# Patient Record
Sex: FEMALE | Race: WHITE | Employment: UNEMPLOYED | URBAN - METROPOLITAN AREA
[De-identification: names, ages, dates, MRNs, and addresses within clinical notes are randomized per-mention and may not be internally consistent; named-entity substitution may affect disease eponyms.]

---

## 2017-03-09 ENCOUNTER — OFFICE VISIT (OUTPATIENT)
Dept: ENDOCRINOLOGY | Age: 47
End: 2017-03-09

## 2017-03-09 VITALS
HEIGHT: 60 IN | HEART RATE: 84 BPM | RESPIRATION RATE: 16 BRPM | BODY MASS INDEX: 25.17 KG/M2 | WEIGHT: 128.2 LBS | SYSTOLIC BLOOD PRESSURE: 111 MMHG | OXYGEN SATURATION: 97 % | DIASTOLIC BLOOD PRESSURE: 70 MMHG

## 2017-03-09 DIAGNOSIS — E13.40 DIABETIC NEUROPATHY ASSOCIATED WITH OTHER SPECIFIED DIABETES MELLITUS: Primary | ICD-10-CM

## 2017-03-09 LAB — HBA1C MFR BLD: 8.1 %

## 2017-03-09 PROCEDURE — 83036 HEMOGLOBIN GLYCOSYLATED A1C: CPT | Performed by: INTERNAL MEDICINE

## 2017-03-09 PROCEDURE — 99214 OFFICE O/P EST MOD 30 MIN: CPT | Performed by: INTERNAL MEDICINE

## 2017-03-09 RX ORDER — LAMOTRIGINE 200 MG/1
200 TABLET ORAL DAILY
COMMUNITY

## 2017-03-09 RX ORDER — PROPRANOLOL HYDROCHLORIDE 20 MG/1
20 TABLET ORAL 3 TIMES DAILY
COMMUNITY

## 2017-03-09 RX ORDER — BUTALBITAL, ASPIRIN, AND CAFFEINE 325; 50; 40 MG/1; MG/1; MG/1
1 CAPSULE ORAL EVERY 4 HOURS PRN
COMMUNITY

## 2017-03-09 RX ORDER — ALBUTEROL SULFATE 90 UG/1
2 AEROSOL, METERED RESPIRATORY (INHALATION) EVERY 6 HOURS PRN
COMMUNITY
End: 2019-06-06 | Stop reason: ALTCHOICE

## 2017-03-15 DIAGNOSIS — E10.42 DM TYPE 1 WITH DIABETIC PERIPHERAL NEUROPATHY (HCC): ICD-10-CM

## 2017-05-31 ENCOUNTER — TELEPHONE (OUTPATIENT)
Dept: ENDOCRINOLOGY | Age: 47
End: 2017-05-31

## 2017-06-15 ENCOUNTER — OFFICE VISIT (OUTPATIENT)
Dept: ENDOCRINOLOGY | Age: 47
End: 2017-06-15

## 2017-06-15 VITALS
WEIGHT: 126.2 LBS | RESPIRATION RATE: 16 BRPM | HEART RATE: 82 BPM | OXYGEN SATURATION: 95 % | HEIGHT: 60 IN | SYSTOLIC BLOOD PRESSURE: 115 MMHG | DIASTOLIC BLOOD PRESSURE: 72 MMHG | BODY MASS INDEX: 24.77 KG/M2

## 2017-06-15 DIAGNOSIS — E10.8 TYPE 1 DIABETES MELLITUS WITH COMPLICATION (HCC): Primary | ICD-10-CM

## 2017-06-15 DIAGNOSIS — R79.89 HIGH THYROID STIMULATING HORMONE (TSH) LEVEL: ICD-10-CM

## 2017-06-15 PROCEDURE — 99214 OFFICE O/P EST MOD 30 MIN: CPT | Performed by: INTERNAL MEDICINE

## 2017-06-15 RX ORDER — HYDROXYZINE PAMOATE 50 MG/1
50 CAPSULE ORAL 3 TIMES DAILY PRN
COMMUNITY
End: 2018-11-15 | Stop reason: ALTCHOICE

## 2017-07-26 ENCOUNTER — TELEPHONE (OUTPATIENT)
Dept: ENDOCRINOLOGY | Age: 47
End: 2017-07-26

## 2017-07-26 DIAGNOSIS — E10.42 DM TYPE 1 WITH DIABETIC PERIPHERAL NEUROPATHY (HCC): ICD-10-CM

## 2017-07-26 RX ORDER — LANCETS
EACH MISCELLANEOUS
Qty: 300 EACH | Refills: 11 | Status: SHIPPED | OUTPATIENT
Start: 2017-07-26

## 2017-08-02 ENCOUNTER — TELEPHONE (OUTPATIENT)
Dept: ENDOCRINOLOGY | Age: 47
End: 2017-08-02

## 2017-08-31 ENCOUNTER — TELEPHONE (OUTPATIENT)
Dept: ENDOCRINOLOGY | Age: 47
End: 2017-08-31

## 2017-08-31 DIAGNOSIS — E10.42 DM TYPE 1 WITH DIABETIC PERIPHERAL NEUROPATHY (HCC): ICD-10-CM

## 2017-10-13 RX ORDER — BLOOD-GLUCOSE METER
1 EACH MISCELLANEOUS ONCE
Qty: 1 DEVICE | Refills: 0 | OUTPATIENT
Start: 2017-10-13 | End: 2017-10-13

## 2018-01-28 ENCOUNTER — TELEPHONE (OUTPATIENT)
Dept: ENDOCRINOLOGY | Age: 48
End: 2018-01-28

## 2018-11-15 ENCOUNTER — OFFICE VISIT (OUTPATIENT)
Dept: ENDOCRINOLOGY | Age: 48
End: 2018-11-15
Payer: MEDICARE

## 2018-11-15 VITALS
WEIGHT: 126 LBS | SYSTOLIC BLOOD PRESSURE: 112 MMHG | RESPIRATION RATE: 16 BRPM | HEART RATE: 87 BPM | HEIGHT: 60 IN | DIASTOLIC BLOOD PRESSURE: 71 MMHG | OXYGEN SATURATION: 95 % | BODY MASS INDEX: 24.74 KG/M2

## 2018-11-15 DIAGNOSIS — E10.8 TYPE 1 DIABETES MELLITUS WITH COMPLICATION (HCC): Primary | ICD-10-CM

## 2018-11-15 DIAGNOSIS — E10.42 DIABETIC POLYNEUROPATHY ASSOCIATED WITH TYPE 1 DIABETES MELLITUS (HCC): ICD-10-CM

## 2018-11-15 PROCEDURE — 99214 OFFICE O/P EST MOD 30 MIN: CPT | Performed by: INTERNAL MEDICINE

## 2018-11-15 RX ORDER — ATORVASTATIN CALCIUM 20 MG/1
20 TABLET, FILM COATED ORAL DAILY
COMMUNITY
End: 2019-06-07

## 2018-11-15 RX ORDER — QUETIAPINE FUMARATE 50 MG/1
50 TABLET, EXTENDED RELEASE ORAL NIGHTLY
COMMUNITY

## 2018-11-15 RX ORDER — MELOXICAM 15 MG/1
15 TABLET ORAL DAILY
COMMUNITY

## 2018-11-15 RX ORDER — FLUOXETINE 10 MG/1
10 CAPSULE ORAL DAILY
COMMUNITY

## 2018-11-15 RX ORDER — KETOCONAZOLE 20 MG/ML
SHAMPOO TOPICAL DAILY PRN
COMMUNITY

## 2018-11-15 RX ORDER — THIAMINE MONONITRATE (VIT B1) 100 MG
100 TABLET ORAL DAILY
COMMUNITY

## 2018-11-15 RX ORDER — ACETAMINOPHEN 500 MG
500 TABLET ORAL EVERY 6 HOURS PRN
COMMUNITY

## 2018-11-15 RX ORDER — GABAPENTIN 100 MG/1
100 CAPSULE ORAL 3 TIMES DAILY
COMMUNITY

## 2018-11-15 RX ORDER — DIVALPROEX SODIUM 125 MG/1
125 CAPSULE, COATED PELLETS ORAL 3 TIMES DAILY
COMMUNITY

## 2018-11-15 RX ORDER — LOPERAMIDE HYDROCHLORIDE 2 MG/1
2 CAPSULE ORAL 4 TIMES DAILY PRN
COMMUNITY

## 2018-11-15 RX ORDER — UREA 10 %
800 LOTION (ML) TOPICAL DAILY
COMMUNITY

## 2018-11-15 NOTE — PROGRESS NOTES
of Onset    Cancer Mother         breast     Heart Disease Father     Arthritis Maternal Grandmother     Osteoporosis Maternal Grandmother     Heart Disease Maternal Grandmother     Arthritis Paternal Grandmother     Osteoporosis Paternal Grandmother     Stroke Paternal Grandfather      History   Smoking Status    Former Smoker    Packs/day: 0.50    Years: 8.00   Smokeless Tobacco    Former User     Comment: still not ready to quit 2/18/14      History   Alcohol Use No       HPI    Nidhi Acuna is a 48 yrs old white female who is here for a follow-up for Type 1 DM    PCP Dr. Brigitte Duarte MD     Last seen in 06/17. Interim history : had episodes of severe DKA, hypoglycemia, neurological complications,Had trach and PEG  She is doing much better. No recent hospitalizations. Now living at [facility] since 12/17    She has a PMH of type 1 DM, depression, anxiety, fibromyalgia. Multiple hospitalizations for hypoglycemia, DKA    She was in alf in 11/14 for 4-6 weeks and then again in 2015 and 2016. 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      Had multiple episodes of DKA in [id number] when she was on insulin pump. She was diagnosed with type 1 DM at age of 13 yrs. Microvascular complications :No Retinopathy ( last eye exam 1/14 as per patient), no known nephropathy ( last urine microalbumin was normal in 01/13 and 01/15). She has diabetic neuropathy in both feet. She has numbness and tingling. No ulcer. She was on insulin pump since 2008. Her insurance is not covering her insulin pump supplies. Currently on   Levemir 7 units QHS  Novolog 5 units TID   Novolog SSI  -180 4 units  181-240 6 units  241-300 8 units  301-350 10  351-400 12 units    Checks BS 4-5 times a day    FBS   Lunch 100-250  Dinner 100-250  Bedtime 100-200      Hypoglycemia: no significant episodes recently. Diet pattern: Eats 2-3 meals /day. No Snacks.        Review of Systems

## 2019-02-19 ENCOUNTER — OFFICE VISIT (OUTPATIENT)
Dept: ENDOCRINOLOGY | Age: 49
End: 2019-02-19
Payer: MEDICARE

## 2019-02-19 VITALS
HEART RATE: 92 BPM | BODY MASS INDEX: 31.8 KG/M2 | DIASTOLIC BLOOD PRESSURE: 70 MMHG | HEIGHT: 60 IN | WEIGHT: 162 LBS | OXYGEN SATURATION: 95 % | SYSTOLIC BLOOD PRESSURE: 124 MMHG

## 2019-02-19 DIAGNOSIS — E78.2 MIXED HYPERLIPIDEMIA: ICD-10-CM

## 2019-02-19 DIAGNOSIS — E10.42 DIABETIC POLYNEUROPATHY ASSOCIATED WITH TYPE 1 DIABETES MELLITUS (HCC): ICD-10-CM

## 2019-02-19 DIAGNOSIS — E10.8 TYPE 1 DIABETES MELLITUS WITH COMPLICATION (HCC): Primary | ICD-10-CM

## 2019-02-19 LAB — HBA1C MFR BLD: 8.6 %

## 2019-02-19 PROCEDURE — 99214 OFFICE O/P EST MOD 30 MIN: CPT | Performed by: INTERNAL MEDICINE

## 2019-02-19 PROCEDURE — 3017F COLORECTAL CA SCREEN DOC REV: CPT | Performed by: INTERNAL MEDICINE

## 2019-02-19 PROCEDURE — G8427 DOCREV CUR MEDS BY ELIG CLIN: HCPCS | Performed by: INTERNAL MEDICINE

## 2019-02-19 PROCEDURE — G8417 CALC BMI ABV UP PARAM F/U: HCPCS | Performed by: INTERNAL MEDICINE

## 2019-02-19 PROCEDURE — 2022F DILAT RTA XM EVC RTNOPTHY: CPT | Performed by: INTERNAL MEDICINE

## 2019-02-19 PROCEDURE — 1036F TOBACCO NON-USER: CPT | Performed by: INTERNAL MEDICINE

## 2019-02-19 PROCEDURE — G8484 FLU IMMUNIZE NO ADMIN: HCPCS | Performed by: INTERNAL MEDICINE

## 2019-02-19 PROCEDURE — 83036 HEMOGLOBIN GLYCOSYLATED A1C: CPT | Performed by: INTERNAL MEDICINE

## 2019-02-19 PROCEDURE — 3045F PR MOST RECENT HEMOGLOBIN A1C LEVEL 7.0-9.0%: CPT | Performed by: INTERNAL MEDICINE

## 2019-03-22 ENCOUNTER — TELEPHONE (OUTPATIENT)
Dept: ENDOCRINOLOGY | Age: 49
End: 2019-03-22

## 2019-04-09 ENCOUNTER — TELEPHONE (OUTPATIENT)
Dept: ENDOCRINOLOGY | Age: 49
End: 2019-04-09

## 2019-04-09 NOTE — TELEPHONE ENCOUNTER
recived lab results, [facility] Aggarwal called and asked for blood sugar logs to be sent over per the doctor they will send them

## 2019-06-06 ENCOUNTER — OFFICE VISIT (OUTPATIENT)
Dept: ENDOCRINOLOGY | Age: 49
End: 2019-06-06
Payer: MEDICARE

## 2019-06-06 VITALS
HEART RATE: 89 BPM | OXYGEN SATURATION: 96 % | WEIGHT: 167.6 LBS | BODY MASS INDEX: 32.9 KG/M2 | DIASTOLIC BLOOD PRESSURE: 79 MMHG | HEIGHT: 60 IN | SYSTOLIC BLOOD PRESSURE: 131 MMHG

## 2019-06-06 DIAGNOSIS — E10.8 TYPE 1 DIABETES MELLITUS WITH COMPLICATION (HCC): ICD-10-CM

## 2019-06-06 DIAGNOSIS — E78.2 MIXED HYPERLIPIDEMIA: ICD-10-CM

## 2019-06-06 DIAGNOSIS — E10.42 DIABETIC POLYNEUROPATHY ASSOCIATED WITH TYPE 1 DIABETES MELLITUS (HCC): Primary | ICD-10-CM

## 2019-06-06 LAB — HBA1C MFR BLD: 8.8 %

## 2019-06-06 PROCEDURE — 83036 HEMOGLOBIN GLYCOSYLATED A1C: CPT | Performed by: INTERNAL MEDICINE

## 2019-06-06 PROCEDURE — 99214 OFFICE O/P EST MOD 30 MIN: CPT | Performed by: INTERNAL MEDICINE

## 2019-06-06 PROCEDURE — 3045F PR MOST RECENT HEMOGLOBIN A1C LEVEL 7.0-9.0%: CPT | Performed by: INTERNAL MEDICINE

## 2019-06-06 PROCEDURE — 1036F TOBACCO NON-USER: CPT | Performed by: INTERNAL MEDICINE

## 2019-06-06 PROCEDURE — G8417 CALC BMI ABV UP PARAM F/U: HCPCS | Performed by: INTERNAL MEDICINE

## 2019-06-06 PROCEDURE — 2022F DILAT RTA XM EVC RTNOPTHY: CPT | Performed by: INTERNAL MEDICINE

## 2019-06-06 PROCEDURE — 3017F COLORECTAL CA SCREEN DOC REV: CPT | Performed by: INTERNAL MEDICINE

## 2019-06-06 PROCEDURE — G8427 DOCREV CUR MEDS BY ELIG CLIN: HCPCS | Performed by: INTERNAL MEDICINE

## 2019-06-06 NOTE — PROGRESS NOTES
[facility]  Shanta Shields M.D. Phone: [phone]   FAX: [phone]       Jad Kilgore   YOB: 1970    Date of Visit:  6/6/2019    Allergies   Allergen Reactions    Amoxicillin     Ketorolac Tromethamine     Tramadol     Sumatriptan Nausea And Vomiting     Outpatient Medications Marked as Taking for the 6/6/19 encounter (Office Visit) with Mario Muniz MD   Medication Sig Dispense Refill    insulin aspart (NOVOLOG FLEXPEN) 100 UNIT/ML injection pen Inject 6 units before breakfast, 5 units before lunch and supper + sliding scale. 5 pen 2    gabapentin (NEURONTIN) 100 MG capsule Take 100 mg by mouth 3 times daily. Douglas Demark divalproex (DEPAKOTE SPRINKLES) 125 MG capsule Take 125 mg by mouth 3 times daily      potassium bicarb-citric acid (EFFER-K) 20 MEQ TBEF effervescent tablet Take 20 mEq by mouth daily      folic acid (FOLVITE) 900 MCG tablet Take 800 mcg by mouth daily      Dextrose, Diabetic Use, (GLUTOSE 15 PO) Take by mouth      ketoconazole (NIZORAL) 2 % shampoo Apply topically daily as needed for Itching Apply topically daily as needed.  loperamide (IMODIUM) 2 MG capsule Take 2 mg by mouth 4 times daily as needed for Diarrhea      atorvastatin (LIPITOR) 20 MG tablet Take 20 mg by mouth daily      acetaminophen (TYLENOL) 500 MG tablet Take 500 mg by mouth every 6 hours as needed for Pain      vitamin B-1 (THIAMINE) 100 MG tablet Take 100 mg by mouth daily      FLUoxetine (PROZAC) 10 MG capsule Take 10 mg by mouth daily      QUEtiapine (SEROQUEL XR) 50 MG extended release tablet Take 50 mg by mouth nightly      meloxicam (MOBIC) 15 MG tablet Take 15 mg by mouth daily      LEVEMIR FLEXPEN 100 UNIT/ML injection pen Inject 7 Units into the skin nightly      glucagon 1 MG injection Inject for low blood sugar as directed. 4 kit 5    ONE TOUCH ULTRASOFT LANCETS MISC Test 8-10 times daily. Brittle type 1 DM. H/o significant hypoglycemia and DKA.  300 each 11  glucose blood VI test strips (ONETOUCH VERIO) strip Test 5-6 times daily. Brittle type 1 DM. Significant hypoglycemia, hyperglycemia. 200 each 11    Insulin Pen Needle 32G X 4 MM MISC Inject 1 each into the skin 4 times daily 300 each 2    lamoTRIgine (LAMICTAL) 200 MG tablet Take 200 mg by mouth daily      butalbital-aspirin-caffeine (FIORINAL) -40 MG capsule Take 1 capsule by mouth every 4 hours as needed for Headaches      propranolol (INDERAL) 20 MG tablet Take 20 mg by mouth 3 times daily      traZODone (DESYREL) 150 MG tablet Take 150 mg by mouth nightly      Blood Glucose Monitoring Suppl (ONETOUCH VERIO) W/DEVICE KIT Use as directed to monitor sugars. 1 kit 0    ibuprofen (ADVIL;MOTRIN) 800 MG tablet Take 1 tablet by mouth every 8 hours as needed for Pain. 90 tablet 4         Vitals:    19 1054   BP: 131/79   Site: Right Upper Arm   Position: Sitting   Cuff Size: Medium Adult   Pulse: 89   SpO2: 96%   Weight: 167 lb 9.6 oz (76 kg)   Height: 5' (1.524 m)     Body mass index is 32.73 kg/m².      Wt Readings from Last 3 Encounters:   19 167 lb 9.6 oz (76 kg)   19 162 lb (73.5 kg)   11/15/18 126 lb (57.2 kg)     BP Readings from Last 3 Encounters:   19 131/79   19 124/70   11/15/18 112/71        Past Medical History:   Diagnosis Date    Anxiety attack     Arthritis     Back pain     Depression     Diabetes mellitus type 1 ([address] dx 10 yoa    endo: Dr. Miguel Staples, A1c 7.6 2013, using pump since     Diabetic neuropathy ([address]     Fibromyalgia     Migraine     Numbness of toes      Past Surgical History:   Procedure Laterality Date    ANKLE SURGERY  left ankle     SECTION      3     CHOLECYSTECTOMY      DILATION AND CURETTAGE OF UTERUS      HYSTERECTOMY      LAPAROSCOPY      SHOULDER SURGERY  left    TOE SURGERY  x2 left foot 5th digit    TUMOR REMOVAL  bone     Family History   Problem Relation Age of Onset    Cancer Mother         breast diabetic neuropathy in both feet. She has numbness and tingling. No ulcer. On neurontin 100 mg at bedtime. Review of Systems   Constitutional: Positive for malaise/fatigue. Negative for weight loss. HENT: Negative for sore throat. Eyes: Negative for blurred vision and double vision. Respiratory: Negative for cough and shortness of breath. Cardiovascular: Negative for chest pain and palpitations. Gastrointestinal: Negative for heartburn, nausea, vomiting and abdominal pain. + Diarrhea. Genitourinary: Negative for urgency and frequency. Musculoskeletal: Positive for myalgias and joint pain. Negative for back pain. Skin: Negative for rash. Neurological: Positive for tingling and sensory change. Negative for headaches. Endo/Heme/Allergies: Negative for polydipsia. Psychiatric/Behavioral:  Negative for depression. The patient is not nervous/anxious. Physical Exam   Constitutional: She is oriented to person, place, and time. She appears well-developed and well-nourished. HENT:   Head: Normocephalic. Mouth/Throat: Oropharynx is clear and moist.   Eyes: EOM are normal. Right eye exhibits no discharge. Neck: No thyromegaly present. Cardiovascular: Normal rate and normal heart sounds. Pulmonary/Chest: Effort normal and breath sounds normal.   Abdominal: Soft. She exhibits no distension. There is no tenderness. Musculoskeletal: She exhibits edema (Left More than Right). She exhibits no tenderness. No ulcer on foot exam  No calus on foot exam   Neurological: She is alert and oriented to person, place, and time. Impaired sensation to 10 grams monofilament testing. Normal pulses in both feet. Skin: Skin is warm. No rash noted. She is not diaphoretic. Psychiatric: Her behavior is normal.          Assessment/Plan    1. Type 1 DM    This 46 yrs old female has Type 1 DM which is very  brittle.   Had episodes of severe hypoglycemia and DKA in the past.  No recent hospitalizations. A1c 11.1 %---> 7 %----> 7.5 %--->6.6 %-->6.9 %---> 7.4 %---> 6.6 % ---> 7.2 %  ---> 7.9 % --> 6.7 % ---> 6.5 %---> 8.1 % ---> 8.4 % ---> 8.6 % ---> 8.8 %     Complicated by neuropathy. BS variable. BS high after breakfast     - Coninue levemir 11 units QHS   Increase novolog to 7 units with breakfast   -Continue novolog 5 units with lunch and dinner  Continue same sliding scale. Called nurse to update the insulin dose. Recommended eye exam once a year. No nephropathy ( last urine microalbumin normal in 01/13 and 01/15, 04/17). Discussed foot care. Foot exam done 11/18  Smoker. Counseled on  smoking cessation. BP at goal .      2. Diabetic neuropathy. On Neurontin    3. Hyperlipidemia    HDl 47    . On  fish oil and lipitor      [facility] to fax me her sugars every 2 weeks and fax recent lab results. [address] to give orders on change of insulin to her Nurse Brock Oliva.

## 2019-06-06 NOTE — PATIENT INSTRUCTIONS
Lab Results   Component Value Date    LABA1C 8.8 06/06/2019     Lab Results   Component Value Date     01/11/2013

## 2019-06-07 RX ORDER — ATORVASTATIN CALCIUM 20 MG/1
20 TABLET, FILM COATED ORAL DAILY
Qty: 30 TABLET | Refills: 5
Start: 2019-06-07

## 2019-09-12 ENCOUNTER — OFFICE VISIT (OUTPATIENT)
Dept: ENDOCRINOLOGY | Age: 49
End: 2019-09-12
Payer: MEDICARE

## 2019-09-12 VITALS
SYSTOLIC BLOOD PRESSURE: 124 MMHG | HEART RATE: 107 BPM | HEIGHT: 60 IN | DIASTOLIC BLOOD PRESSURE: 75 MMHG | OXYGEN SATURATION: 95 % | BODY MASS INDEX: 34.59 KG/M2 | WEIGHT: 176.2 LBS

## 2019-09-12 DIAGNOSIS — E78.2 MIXED HYPERLIPIDEMIA: ICD-10-CM

## 2019-09-12 DIAGNOSIS — J06.9 UPPER RESPIRATORY TRACT INFECTION, UNSPECIFIED TYPE: ICD-10-CM

## 2019-09-12 DIAGNOSIS — E10.42 DIABETIC POLYNEUROPATHY ASSOCIATED WITH TYPE 1 DIABETES MELLITUS (HCC): ICD-10-CM

## 2019-09-12 DIAGNOSIS — E10.8 TYPE 1 DIABETES MELLITUS WITH COMPLICATION (HCC): Primary | ICD-10-CM

## 2019-09-12 LAB — HBA1C MFR BLD: 8.3 %

## 2019-09-12 PROCEDURE — G8427 DOCREV CUR MEDS BY ELIG CLIN: HCPCS | Performed by: INTERNAL MEDICINE

## 2019-09-12 PROCEDURE — G8417 CALC BMI ABV UP PARAM F/U: HCPCS | Performed by: INTERNAL MEDICINE

## 2019-09-12 PROCEDURE — 83036 HEMOGLOBIN GLYCOSYLATED A1C: CPT | Performed by: INTERNAL MEDICINE

## 2019-09-12 PROCEDURE — 3017F COLORECTAL CA SCREEN DOC REV: CPT | Performed by: INTERNAL MEDICINE

## 2019-09-12 PROCEDURE — 2022F DILAT RTA XM EVC RTNOPTHY: CPT | Performed by: INTERNAL MEDICINE

## 2019-09-12 PROCEDURE — 1036F TOBACCO NON-USER: CPT | Performed by: INTERNAL MEDICINE

## 2019-09-12 PROCEDURE — 99214 OFFICE O/P EST MOD 30 MIN: CPT | Performed by: INTERNAL MEDICINE

## 2019-09-12 PROCEDURE — 3045F PR MOST RECENT HEMOGLOBIN A1C LEVEL 7.0-9.0%: CPT | Performed by: INTERNAL MEDICINE

## 2019-09-12 RX ORDER — OMEGA-3/DHA/EPA/FISH OIL 60 MG-90MG
500 CAPSULE ORAL 2 TIMES DAILY
COMMUNITY

## 2019-09-12 NOTE — PROGRESS NOTES
Snacks. Hyperlipidemia :   On fish oil 2 capsule daily and  lipitor 20 mg daily    Tolerating without side effects    She has diabetic neuropathy in both feet. She has numbness and tingling. No ulcer. On neurontin 100 mg at bedtime. Review of Systems   Constitutional: Positive for malaise/fatigue. Negative for weight loss. HENT: Negative for sore throat. Eyes: Negative for blurred vision and double vision. Respiratory: Negative for cough and shortness of breath. Cardiovascular: Negative for chest pain and palpitations. Gastrointestinal: Negative for heartburn, nausea, vomiting and abdominal pain. + Diarrhea. Genitourinary: Negative for urgency and frequency. Musculoskeletal: Positive for myalgias and joint pain. Negative for back pain. Skin: Negative for rash. Neurological: Positive for tingling and sensory change. Negative for headaches. Endo/Heme/Allergies: Negative for polydipsia. Psychiatric/Behavioral:  Negative for depression. The patient is not nervous/anxious. Physical Exam   Constitutional: She is oriented to person, place, and time. She appears well-developed and well-nourished. HENT:   Head: Normocephalic. Mouth/Throat: Oropharynx is clear and moist.   Eyes: EOM are normal. Right eye exhibits no discharge. Neck: No thyromegaly present. Cardiovascular: Normal rate and normal heart sounds. Pulmonary/Chest: Effort normal and breath sounds normal.   Abdominal: Soft. She exhibits no distension. There is no tenderness. Musculoskeletal: She exhibits edema (Left More than Right). She exhibits no tenderness. No ulcer on foot exam  No calus on foot exam   Neurological: She is alert and oriented to person, place, and time. Impaired sensation to 10 grams monofilament testing. Normal pulses in both feet. Skin: Skin is warm. No rash noted. She is not diaphoretic. Psychiatric: Her behavior is normal.          Assessment/Plan    1.  Type 1 DM    This 48

## 2019-09-18 ENCOUNTER — TELEPHONE (OUTPATIENT)
Dept: ENDOCRINOLOGY | Age: 49
End: 2019-09-18

## 2020-01-16 ENCOUNTER — OFFICE VISIT (OUTPATIENT)
Dept: ENDOCRINOLOGY | Age: 50
End: 2020-01-16
Payer: MEDICARE

## 2020-01-16 VITALS
SYSTOLIC BLOOD PRESSURE: 115 MMHG | HEIGHT: 60 IN | WEIGHT: 173.8 LBS | HEART RATE: 95 BPM | DIASTOLIC BLOOD PRESSURE: 74 MMHG | OXYGEN SATURATION: 98 % | BODY MASS INDEX: 34.12 KG/M2

## 2020-01-16 LAB — HBA1C MFR BLD: 10 %

## 2020-01-16 PROCEDURE — G8427 DOCREV CUR MEDS BY ELIG CLIN: HCPCS | Performed by: INTERNAL MEDICINE

## 2020-01-16 PROCEDURE — 1036F TOBACCO NON-USER: CPT | Performed by: INTERNAL MEDICINE

## 2020-01-16 PROCEDURE — 83036 HEMOGLOBIN GLYCOSYLATED A1C: CPT | Performed by: INTERNAL MEDICINE

## 2020-01-16 PROCEDURE — 3017F COLORECTAL CA SCREEN DOC REV: CPT | Performed by: INTERNAL MEDICINE

## 2020-01-16 PROCEDURE — 3046F HEMOGLOBIN A1C LEVEL >9.0%: CPT | Performed by: INTERNAL MEDICINE

## 2020-01-16 PROCEDURE — 99214 OFFICE O/P EST MOD 30 MIN: CPT | Performed by: INTERNAL MEDICINE

## 2020-01-16 PROCEDURE — G8417 CALC BMI ABV UP PARAM F/U: HCPCS | Performed by: INTERNAL MEDICINE

## 2020-01-16 PROCEDURE — G8484 FLU IMMUNIZE NO ADMIN: HCPCS | Performed by: INTERNAL MEDICINE

## 2020-01-16 PROCEDURE — 2022F DILAT RTA XM EVC RTNOPTHY: CPT | Performed by: INTERNAL MEDICINE

## 2020-01-16 NOTE — PROGRESS NOTES
Behavior: Behavior normal.            Assessment/Plan    1. Type 1 DM    This 44 yrs old female has Type 1 DM which is very  brittle. Had episodes of severe hypoglycemia and DKA in the past.  No recent hospitalizations. A1c 11.1 %---> 7 %----> 7.5 %--->6.6 %-->6.9 %---> 7.4 %---> 6.6 % ---> 7.2 %  ---> 7.9 % --> 6.7 % ---> 6.5 %---> 8.1 % ---> 8.4 % ---> 8.6 % ---> 8.8 % ---> 8.3 % ---> 10 %     Complicated by neuropathy. BS variable. fasting blood sugars high.       - Increase levemir to 12 units QHS   Continue novolog  7 units with breakfast, 5 units with lunch and dinner  Continue same sliding scale. Called nurse to update the insulin dose. Recommended eye exam once a year. No nephropathy ( last urine microalbumin normal in 01/13 and 01/15, 04/17). Discussed foot care. Foot exam done 11/18  Smoker. Counseled on  smoking cessation. BP at goal .      2. Diabetic neuropathy. On Neurontin    3. Hyperlipidemia    HDL 47    . On  fish oil and lipitor          [facility] to fax me her sugars every 2 weeks and fax recent lab results.

## 2020-03-25 ENCOUNTER — TELEPHONE (OUTPATIENT)
Dept: ENDOCRINOLOGY | Age: 50
End: 2020-03-25

## 2020-03-25 NOTE — TELEPHONE ENCOUNTER
Called pt's nurse at Nursing home and advised per Dr. Tara Arredondo: Patient needs to ask her PCP at Nursing home for this medication. Nurse stated understanding.

## 2020-12-28 ENCOUNTER — TELEPHONE (OUTPATIENT)
Dept: ENDOCRINOLOGY | Age: 50
End: 2020-12-28

## 2020-12-28 NOTE — TELEPHONE ENCOUNTER
Very brittle diabetes with a wide fluctuations of blood glucose. Please ask nursing home to send the record not just of blood glucose, but also insulin which was given. No changes at this time until I receive both of blood glucose and insulin record. Can fax in a 1 to 2 weeks.

## 2020-12-28 NOTE — TELEPHONE ENCOUNTER
Requested information from [facility] has been faxed to office and given to provider.

## 2020-12-30 NOTE — TELEPHONE ENCOUNTER
Spoke with Shahla Connor at [facility] and informed her to send glucose logs again in 1 week.

## 2020-12-30 NOTE — TELEPHONE ENCOUNTER
Reviewed blood glucose records and insulin orders. Levemir is administered 18 units at bedtime. NovoLog is administered 7 units before each meal.  NovoLog sliding scale before meals, starting at blood glucose of 130. Please inform nursing home to send us blood glucose records again with insulin orders in 1 to 2 weeks.

## 2021-02-11 ENCOUNTER — OFFICE VISIT (OUTPATIENT)
Dept: ENDOCRINOLOGY | Age: 51
End: 2021-02-11
Payer: MEDICARE

## 2021-02-11 VITALS
HEIGHT: 60 IN | OXYGEN SATURATION: 96 % | BODY MASS INDEX: 32.98 KG/M2 | DIASTOLIC BLOOD PRESSURE: 74 MMHG | WEIGHT: 168 LBS | HEART RATE: 74 BPM | SYSTOLIC BLOOD PRESSURE: 134 MMHG

## 2021-02-11 DIAGNOSIS — E66.9 CLASS 1 OBESITY WITH SERIOUS COMORBIDITY AND BODY MASS INDEX (BMI) OF 32.0 TO 32.9 IN ADULT, UNSPECIFIED OBESITY TYPE: ICD-10-CM

## 2021-02-11 DIAGNOSIS — E04.9 GOITER: ICD-10-CM

## 2021-02-11 DIAGNOSIS — E78.2 MIXED HYPERLIPIDEMIA: ICD-10-CM

## 2021-02-11 PROBLEM — E66.811 CLASS 1 OBESITY WITH BODY MASS INDEX (BMI) OF 33.0 TO 33.9 IN ADULT: Status: ACTIVE | Noted: 2021-02-11

## 2021-02-11 PROCEDURE — 3017F COLORECTAL CA SCREEN DOC REV: CPT | Performed by: INTERNAL MEDICINE

## 2021-02-11 PROCEDURE — G8427 DOCREV CUR MEDS BY ELIG CLIN: HCPCS | Performed by: INTERNAL MEDICINE

## 2021-02-11 PROCEDURE — 3046F HEMOGLOBIN A1C LEVEL >9.0%: CPT | Performed by: INTERNAL MEDICINE

## 2021-02-11 PROCEDURE — 99215 OFFICE O/P EST HI 40 MIN: CPT | Performed by: INTERNAL MEDICINE

## 2021-02-11 PROCEDURE — 2022F DILAT RTA XM EVC RTNOPTHY: CPT | Performed by: INTERNAL MEDICINE

## 2021-02-11 PROCEDURE — G8417 CALC BMI ABV UP PARAM F/U: HCPCS | Performed by: INTERNAL MEDICINE

## 2021-02-11 PROCEDURE — 1036F TOBACCO NON-USER: CPT | Performed by: INTERNAL MEDICINE

## 2021-02-11 PROCEDURE — G8484 FLU IMMUNIZE NO ADMIN: HCPCS | Performed by: INTERNAL MEDICINE

## 2021-02-11 RX ORDER — POTASSIUM BICARBONATE 25 MEQ/1
25 TABLET, EFFERVESCENT ORAL 2 TIMES DAILY
COMMUNITY

## 2021-02-11 RX ORDER — AMITRIPTYLINE HYDROCHLORIDE 25 MG/1
25 TABLET, FILM COATED ORAL NIGHTLY
COMMUNITY

## 2021-02-11 RX ORDER — PHENOL 1.4 %
1 AEROSOL, SPRAY (ML) MUCOUS MEMBRANE DAILY
COMMUNITY

## 2021-02-11 RX ORDER — OMEPRAZOLE 20 MG/1
40 CAPSULE, DELAYED RELEASE ORAL DAILY
COMMUNITY

## 2021-02-11 NOTE — PROGRESS NOTES
Harley Lal is a 44 y.o. female who is being evaluated for Type 1 diabetes mellitus. Current symptoms/problems include hyperglycemia and show worsening. Type 1 diabetes, uncontrolled, with neuropathy (HCC) [E10.40, E10.65]    Diagnosed with Type 1 diabetes mellitus in 1980 at age 13     Comorbid conditions: obesity and Neuropathy    Current diabetic medications include: Levemir 16 units nightly, Humalog 7 units before breakfast, lunch and dinner plus sliding scale  Novolog SSI  -180 2 units  181-240 4 units  241-300 6 units  301-350 8 units  351-400 10 units  >401 12 units    Past medical history of type 1 diabetes, depression, anxiety, fibromyalgia. Multiple hospitalizations for hypoglycemia and DKA, last 2018  Patient was in retirement in November 2014 for 4 to 6 weeks and then again in 2015 and 2016. Patient was in rehab for heroin addiction in the past.  Patient had multiple episodes of DKA in 20132014 when she was on insulin pump. Intolerance to diabetes medications: No  Falls frequently     Weight trend: stable  Prior visit with dietician: yes  Current diet: on average, 3 meals per day  Current exercise: walks with a walker     Current monitoring regimen: home blood tests - 4 times daily  Has brought blood glucose log/meter:  Yes  Home blood sugar records: fasting range:  and postprandial range:    Any episodes of hypoglycemia? Yes  Hypoglycemia frequency and time(s):   Once in 2 weeks  Does patient have Glucagon emergency kit? Yes  Does patient have rapid acting carbohydrate? Yes  Does patient wear a medic alert bracelet or necklace? No    2. Mixed hyperlipidemia  No muscle pain    3. Class 1 obesity with serious comorbidity and body mass index (BMI) of 32.0 to 32.9 in adult, unspecified obesity type  Lives in facility [facility] re[facility]  Moves with walker    4.   Goiter  No difficulty swallowing, no voice change  Mother has thyroid disease  No thyroid cancer in the family  No history of radiation to her neck    Past Medical History:   Diagnosis Date    Anxiety attack     Arthritis     Back pain     Depression     Diabetes mellitus type 1 ([facility] dx 10 yoa    endo: Dr. Orly Burrell, A1c 7.6 2013, using pump since     Diabetic neuropathy ([address]     Fibromyalgia     Migraine     Numbness of toes       Patient Active Problem List   Diagnosis    Diabetic neuropathy ([facility]    Fibromyalgia    Depression    Anxiety attack    Back pain    Arthritis    Migraine    DM (diabetes mellitus) ([address]    Bronchitis    DKA (diabetic ketoacidoses) ([address]    Drug abuse ([facility] [address]    Alcohol abuse    Mixed hyperlipidemia    Type 1 diabetes, uncontrolled, with neuropathy ([facility]    Class 1 obesity with body mass index (BMI) of 33.0 to 33.9 in adult    Goiter     Past Surgical History:   Procedure Laterality Date    ANKLE SURGERY  left ankle     SECTION      3     CHOLECYSTECTOMY      DILATION AND CURETTAGE OF UTERUS      HYSTERECTOMY      LAPAROSCOPY      SHOULDER SURGERY  left    TOE SURGERY  x2 left foot 5th digit    TUMOR REMOVAL  bone     Social History     Socioeconomic History    Marital status:      Spouse name: Not on file    Number of children: Not on file    Years of education: Not on file    Highest education level: Not on file   Occupational History    Not on file   Social Needs    Financial resource strain: Not on file    Food insecurity     Worry: Not on file     Inability: Not on file    Transportation needs     Medical: Not on file     Non-medical: Not on file   Tobacco Use    Smoking status: Former Smoker     Packs/day: 0.50     Years: 8.00     Pack years: 4.00     Types: Cigarettes     Quit date: 2017     Years since quittin.1    Smokeless tobacco: Never Used    Tobacco comment: still not ready to quit 14   Substance and Sexual Activity    Alcohol use: No    Drug use: No     Comment: history of heroin use    Sexual activity: Not on file   Lifestyle    Physical activity     Days per week: Not on file     Minutes per session: Not on file    Stress: Not on file   Relationships    Social connections     Talks on phone: Not on file     Gets together: Not on file     Attends Sabianism service: Not on file     Active member of club or organization: Not on file     Attends meetings of clubs or organizations: Not on file     Relationship status: Not on file    Intimate partner violence     Fear of current or ex partner: Not on file     Emotionally abused: Not on file     Physically abused: Not on file     Forced sexual activity: Not on file   Other Topics Concern    Not on file   Social History Narrative    Not on file     Family History   Problem Relation Age of Onset    Cancer Mother         breast     Heart Disease Father     Arthritis Maternal Grandmother     Osteoporosis Maternal Grandmother     Heart Disease Maternal Grandmother     Arthritis Paternal Grandmother     Osteoporosis Paternal Grandmother     Stroke Paternal Grandfather      Current Outpatient Medications   Medication Sig Dispense Refill    insulin detemir (LEVEMIR FLEXTOUCH) 100 UNIT/ML injection pen Inject 16 Units into the skin nightly 5 pen 3    amitriptyline (ELAVIL) 25 MG tablet Take 25 mg by mouth nightly      calcium carbonate 600 MG TABS tablet Take 1 tablet by mouth daily      insulin lispro (HUMALOG) 100 UNIT/ML injection vial Inject into the skin 3 times daily (before meals)      potassium bicarbonate (K-LYTE) 25 MEQ disintegrating tablet Take 25 mEq by mouth 2 times daily      Lysine 500 MG TABS Take by mouth      omeprazole (PRILOSEC) 20 MG delayed release capsule Take 40 mg by mouth daily      Omega-3 Fatty Acids (FISH OIL) 500 MG CAPS Take 500 mg by mouth 2 times daily      atorvastatin (LIPITOR) 20 MG tablet Take 1 tablet by mouth daily 30 tablet 5    gabapentin (NEURONTIN) 100 MG capsule Take 100 mg by mouth 3 times daily. Lyndsey Wilson  divalproex (DEPAKOTE SPRINKLES) 125 MG capsule Take 125 mg by mouth 3 times daily      potassium bicarb-citric acid (EFFER-K) 20 MEQ TBEF effervescent tablet Take 20 mEq by mouth daily      folic acid (FOLVITE) 317 MCG tablet Take 800 mcg by mouth daily      ketoconazole (NIZORAL) 2 % shampoo Apply topically daily as needed for Itching Apply topically daily as needed.  loperamide (IMODIUM) 2 MG capsule Take 2 mg by mouth 4 times daily as needed for Diarrhea      acetaminophen (TYLENOL) 500 MG tablet Take 500 mg by mouth every 6 hours as needed for Pain      vitamin B-1 (THIAMINE) 100 MG tablet Take 100 mg by mouth daily      FLUoxetine (PROZAC) 10 MG capsule Take 10 mg by mouth daily      QUEtiapine (SEROQUEL XR) 50 MG extended release tablet Take 50 mg by mouth nightly      meloxicam (MOBIC) 15 MG tablet Take 15 mg by mouth daily      glucagon 1 MG injection Inject for low blood sugar as directed. 4 kit 5    ONE TOUCH ULTRASOFT LANCETS MISC Test 8-10 times daily. Brittle type 1 DM. H/o significant hypoglycemia and DKA. 300 each 11    glucose blood VI test strips (ONETOUCH VERIO) strip Test 5-6 times daily. Brittle type 1 DM. Significant hypoglycemia, hyperglycemia. 200 each 11    Insulin Pen Needle 32G X 4 MM MISC Inject 1 each into the skin 4 times daily 300 each 2    lamoTRIgine (LAMICTAL) 200 MG tablet Take 200 mg by mouth daily      Blood Glucose Monitoring Suppl (ONETOUCH VERIO) W/DEVICE KIT Use as directed to monitor sugars. 1 kit 0    insulin aspart (NOVOLOG FLEXPEN) 100 UNIT/ML injection pen Inject 7 units before breakfast, 5 units before lunch and supper + sliding scale.  (Patient not taking: Reported on 2/11/2021) 5 pen 2    Dextrose, Diabetic Use, (GLUTOSE 15 PO) Take by mouth      butalbital-aspirin-caffeine (FIORINAL) -40 MG capsule Take 1 capsule by mouth every 4 hours as needed for Headaches      propranolol (INDERAL) 20 MG tablet Take 20 mg by mouth 3 times daily  traZODone (DESYREL) 150 MG tablet Take 150 mg by mouth nightly      ibuprofen (ADVIL;MOTRIN) 800 MG tablet Take 1 tablet by mouth every 8 hours as needed for Pain. (Patient not taking: Reported on 2/11/2021) 90 tablet 4     No current facility-administered medications for this visit.       Allergies   Allergen Reactions    Amoxicillin     Ketorolac Tromethamine     Tramadol     Sumatriptan Nausea And Vomiting     Family Status   Relation Name Status    Mother  (Not Specified)    Father  (Not Specified)    MGM  (Not Specified)    PGM  (Not Specified)    PGF  (Not Specified)       Lab Review:    Lab Results   Component Value Date    WBC 9.5 12/04/2013    HGB 12.5 12/04/2013    HCT 36.6 12/04/2013    MCV 92.6 12/04/2013     12/04/2013     Lab Results   Component Value Date     04/12/2017    K 4.1 04/12/2017    CL 87 04/12/2017    CO2 24 04/12/2017    BUN 15 04/12/2017    CREATININE 0.87 04/12/2017    GLUCOSE 163 04/12/2017    CALCIUM 9.7 04/12/2017    PROT 7.0 04/12/2017    PROT 6.7 01/11/2013    LABALBU 4.6 04/12/2017    BILITOT 0.3 04/12/2017    ALKPHOS 87 04/12/2017    AST 37 04/12/2017    ALT 77 04/12/2017    LABGLOM 79 04/12/2017    GFRAA 91 04/12/2017    GFRAA 129 02/25/2013    AGRATIO 1.9 04/12/2017    GLOB 2.4 04/12/2017     Lab Results   Component Value Date    TSH 4.610 04/12/2017     Lab Results   Component Value Date    LABA1C 10 01/16/2020     Lab Results   Component Value Date     01/11/2013     Lab Results   Component Value Date    CHOL 198 04/12/2017     Lab Results   Component Value Date    TRIG 239 04/12/2017     Lab Results   Component Value Date    HDL 37 04/12/2017    HDL 56 05/15/2012     Lab Results   Component Value Date    LDLCALC 113 04/12/2017     Lab Results   Component Value Date    LABVLDL 48 04/12/2017     Lab Results   Component Value Date    CHOLHDLRATIO 3.9 01/11/2013     Lab Results   Component Value Date    LABMICR 5.5 04/12/2017    LABMICR Yes 12/04/2013     No results found for: VITD25     Review of Systems:  Constitutional: has fatigue, no fever, has recent weight gain, no recent weight loss, no changes in appetite  Eyes: no eye pain, no change in vision, no eye redness, no eye irritation, no double vision  Ears, nose, throat: no nasal congestion, no sore throat, no earache, no decrease in hearing, no hoarseness, no dry mouth, no sinus problems, no difficulty swallowing, no neck lumps, no dental problems, no mouth sores, no ringing in ears  Pulmonary: no shortness of breath, no wheezing, no dyspnea on exertion, no cough  Cardiovascular: no chest pain, no lower extremity edema, no orthopnea, no intermittent leg claudication, no palpitations  Gastrointestinal: no abdominal pain, no nausea, no vomiting, no diarrhea, no constipation, no dysphagia, no heartburn, no bloating  Genitourinary: no dysuria, no urinary incontinence, no urinary hesitancy, no urinary frequency, no feelings of urinary urgency, no nocturia  Musculoskeletal: no joint swelling, no joint stiffness, no joint pain, no muscle cramps, no muscle pain, no bone pain  Integument/Breast: no hair loss, no skin rashes, no skin lesions, no itching, no dry skin  Neurological: no numbness, no tingling, no weakness, no confusion, has headaches, has dizziness, no fainting, no tremors, no decrease in memory, has balance problems  Psychiatric: has anxiety, has depression, no insomnia  Hematologic/Lymphatic: no tendency for easy bleeding, no swollen lymph nodes, no tendency for easy bruising  Immunology: no seasonal allergies, no frequent infections, no frequent illnesses  Endocrine: no temperature intolerance    /74   Pulse 74   Ht 5' (1.524 m)   Wt 168 lb (76.2 kg)   SpO2 96%   BMI 32.81 kg/m²    Wt Readings from Last 3 Encounters:   02/11/21 168 lb (76.2 kg)   01/16/20 173 lb 12.8 oz (78.8 kg)   09/12/19 176 lb 3.2 oz (79.9 kg)     Body mass index is 32.81 kg/m². OBJECTIVE:  Constitutional: no acute distress, well appearing and well nourished  Psychiatric: oriented to person, place and time, judgement and insight and normal, recent and remote memory and intact and mood and affect are normal  Skin: skin and subcutaneous tissue is normal without mass, normal turgor  Head and Face: examination of head and face revealed no abnormalities  Eyes: no lid or conjunctival swelling, erythema or discharge, pupils are normal, equal, round, reactive to light  Ears/Nose: external inspection of ears and nose revealed no abnormalities, hearing is grossly normal  Oropharynx/Mouth/Face: lips, tongue and gums are normal with no lesions, the voice quality was normal  Neck: neck is supple and symmetric, with midline trachea and no masses, thyroid is enlarged  Lymphatics: normal cervical lymph nodes, normal supraclavicular nodes  Pulmonary: no increased work of breathing or signs of respiratory distress, lungs are clear to auscultation  Cardiovascular: normal heart rate and rhythm, normal S1 and S2, no murmurs and pedal pulses and 2+ bilaterally, 1+ edema  Abdomen: abdomen is soft, non-tender with no masses  Musculoskeletal: abnormal gait and station and exam of the digits and nails are normal  Neurological: abnormal coordination and normal general cortical function    Visual inspection:  Deformity/amputation: present - left fifth toe deformity  Skin lesions/pre-ulcerative calluses: absent  Edema: right- 1+, left- 1+    Sensory exam:  Monofilament sensation: abnormal   (minimum of 5 random plantar locations tested, avoiding callused areas - > 1 area with absence of sensation is + for neuropathy)    Plus at least one of the following:  Pulses: normal  Proprioception: Intact  Vibration (128 Hz): Impaired    ASSESSMENT/PLAN:  1.  Type 1 diabetes, uncontrolled, with neuropathy (HCC)  Check glucose 4 times a day  Send blood glucose record every 2 weeks  Continue current insulin regimen:  Levemir 16 units nightly, Humalog 7 units before breakfast, lunch and dinner plus sliding scale  Novolog SSI  -180 2 units  181-240 4 units  241-300 6 units  301-350 8 units  351-400 10 units  >401 12 units  Very brittle diabetes, very wide fluctuations, hypoglycemia and hyperglycemia  - insulin lispro (HUMALOG) 100 UNIT/ML injection vial; Inject into the skin 3 times daily (before meals)  - Hemoglobin A1C; Future  - Comprehensive Metabolic Panel; Future  - Lipid Panel; Future  - Microalbumin / Creatinine Urine Ratio; Future  - Hemoglobin A1C; Future  - HM DIABETES FOOT EXAM  - Comprehensive Metabolic Panel; Future  - Lipid Panel; Future  - Microalbumin / Creatinine Urine Ratio; Future  - insulin detemir (LEVEMIR FLEXTOUCH) 100 UNIT/ML injection pen; Inject 16 Units into the skin nightly  Dispense: 5 pen; Refill: 3    2. Mixed hyperlipidemia  Low-fat, low-cholesterol diet  Continue atorvastatin 20 mg daily  - Lipid Panel; Future    3. Class 1 obesity with serious comorbidity and body mass index (BMI) of 32.0 to 32.9 in adult, unspecified obesity type  Diet, activity as tolerated    4. Goiter  New problem  Mother has hypothyroidism  - T4, Free; Future  - TSH without Reflex; Future  - US HEAD NECK SOFT TISSUE THYROID; Future  - T4, Free; Future  - TSH without Reflex; Future  - Thyroid Peroxidase Antibody; Future  - Anti-Thyroglobulin Antibody;  Future    Reviewed and/or ordered clinical lab results Yes  Reviewed and/or ordered radiology tests Yes  Reviewed and/or ordered other diagnostic tests No  Discussed test results with performing physician No  Independently reviewed image, tracing, or specimen No  Made a decision to obtain old records No  Reviewed and summarized old records Yes   HbA1C 10.0  Obtained history from other than patient No    Jeevan Eva was counseled regarding symptoms of diabetes, hyperlipidemia, goiter diagnosis, course and complications of disease if inadequately treated, side effects of medications, diagnosis, treatment options, and prognosis, risks, benefits, complications, and alternatives of treatment, labs, imaging and other studies and treatment targets and goals. She understands instructions and counseling. These diagnosis were discussed and reviewed with the patient including the advantages of drug therapy. She was counseled at this visit on the following: diabetes complication prevention and foot care. Total time I spent for this encounter 40 minutes    Return in about 3 months (around 5/11/2021) for diabetes.     Electronically signed by Patrick Schwarz MD on 2/12/2021 at 12:29 AM

## 2021-02-12 ENCOUNTER — TELEPHONE (OUTPATIENT)
Dept: ENDOCRINOLOGY | Age: 51
End: 2021-02-12

## 2021-02-12 PROBLEM — E04.9 GOITER: Status: ACTIVE | Noted: 2021-02-12

## 2021-02-12 RX ORDER — INSULIN DETEMIR 100 [IU]/ML
16 INJECTION, SOLUTION SUBCUTANEOUS NIGHTLY
Qty: 5 PEN | Refills: 3
Start: 2021-02-12

## 2021-03-17 ENCOUNTER — TELEPHONE (OUTPATIENT)
Dept: ENDOCRINOLOGY | Age: 51
End: 2021-03-17

## 2021-03-17 NOTE — TELEPHONE ENCOUNTER
Pts mother lvm to have a return call regarding a referral to see a back surgeon. The Referred Drs office is not in the patients network.  They need a new Dr to be referred to

## 2021-03-18 NOTE — TELEPHONE ENCOUNTER
Please inform patient's mother that I saw the patient once for her diabetes. I do not refer patients for back surgery. Please address this with family doctor.

## 2021-03-18 NOTE — TELEPHONE ENCOUNTER
Noted.  Sukhwinder Adrian and mother is not listed. Message came from  that was left on office phone.

## 2021-03-18 NOTE — TELEPHONE ENCOUNTER
PTs Mother Antwon called back, he is on the hippa. I advised her Dr. Hayley Lincoln did not refer the patient.  She stated ok

## 2021-11-08 ENCOUNTER — TELEPHONE (OUTPATIENT)
Dept: ENDOCRINOLOGY | Age: 51
End: 2021-11-08

## 2021-11-08 DIAGNOSIS — E04.9 GOITER: ICD-10-CM

## 2021-11-08 DIAGNOSIS — E78.2 MIXED HYPERLIPIDEMIA: ICD-10-CM

## 2021-11-08 NOTE — TELEPHONE ENCOUNTER
I spoke with Helder Cameron. Patient has not been seen in the office since February. She has never done her blood work, I have not received blood glucose records. I recommended to contact her attending physician for adjustments of blood glucose at this time. Patient does not have appointment scheduled. She needs to schedule appointment in order to continue following with me. I ordered lab work. Fax to the nursing home.

## 2021-11-08 NOTE — TELEPHONE ENCOUNTER
Ko Montilla from [address] [place] [address] called stating that pt's Blood Sugar was 533 this morning. Ko Montilla gave the pt 12 units of humalog(says that the patient routinely gets 4 units then uses a sliding scale.  Please advise

## 2024-07-30 ENCOUNTER — ANESTHESIA EVENT (OUTPATIENT)
Dept: ENDOSCOPY | Age: 54
End: 2024-07-30
Payer: MEDICARE

## 2024-07-31 ENCOUNTER — HOSPITAL ENCOUNTER (OUTPATIENT)
Age: 54
Setting detail: OUTPATIENT SURGERY
Discharge: HOME | End: 2024-07-31
Attending: INTERNAL MEDICINE | Admitting: INTERNAL MEDICINE
Payer: MEDICARE

## 2024-07-31 ENCOUNTER — ANESTHESIA (OUTPATIENT)
Dept: ENDOSCOPY | Age: 54
End: 2024-07-31
Payer: MEDICARE

## 2024-07-31 VITALS
WEIGHT: 130 LBS | OXYGEN SATURATION: 100 % | RESPIRATION RATE: 18 BRPM | DIASTOLIC BLOOD PRESSURE: 79 MMHG | TEMPERATURE: 97.7 F | BODY MASS INDEX: 25.52 KG/M2 | HEIGHT: 60 IN | SYSTOLIC BLOOD PRESSURE: 132 MMHG | HEART RATE: 75 BPM

## 2024-07-31 DIAGNOSIS — R10.9 ABDOMINAL PAIN, UNSPECIFIED ABDOMINAL LOCATION: ICD-10-CM

## 2024-07-31 LAB
GLUCOSE BLD-MCNC: 210 MG/DL (ref 70–99)
PERFORMED ON: ABNORMAL

## 2024-07-31 PROCEDURE — 2709999900 HC NON-CHARGEABLE SUPPLY: Performed by: INTERNAL MEDICINE

## 2024-07-31 PROCEDURE — 3700000001 HC ADD 15 MINUTES (ANESTHESIA): Performed by: INTERNAL MEDICINE

## 2024-07-31 PROCEDURE — 7100000010 HC PHASE II RECOVERY - FIRST 15 MIN: Performed by: INTERNAL MEDICINE

## 2024-07-31 PROCEDURE — 3700000000 HC ANESTHESIA ATTENDED CARE: Performed by: INTERNAL MEDICINE

## 2024-07-31 PROCEDURE — 6360000002 HC RX W HCPCS: Performed by: NURSE ANESTHETIST, CERTIFIED REGISTERED

## 2024-07-31 PROCEDURE — 7100000011 HC PHASE II RECOVERY - ADDTL 15 MIN: Performed by: INTERNAL MEDICINE

## 2024-07-31 PROCEDURE — 3609012400 HC EGD TRANSORAL BIOPSY SINGLE/MULTIPLE: Performed by: INTERNAL MEDICINE

## 2024-07-31 PROCEDURE — 2580000003 HC RX 258: Performed by: ANESTHESIOLOGY

## 2024-07-31 PROCEDURE — 88305 TISSUE EXAM BY PATHOLOGIST: CPT

## 2024-07-31 RX ORDER — PROPOFOL 10 MG/ML
INJECTION, EMULSION INTRAVENOUS PRN
Status: DISCONTINUED | OUTPATIENT
Start: 2024-07-31 | End: 2024-07-31 | Stop reason: SDUPTHER

## 2024-07-31 RX ORDER — SODIUM CHLORIDE, SODIUM LACTATE, POTASSIUM CHLORIDE, CALCIUM CHLORIDE 600; 310; 30; 20 MG/100ML; MG/100ML; MG/100ML; MG/100ML
INJECTION, SOLUTION INTRAVENOUS CONTINUOUS
Status: DISCONTINUED | OUTPATIENT
Start: 2024-07-31 | End: 2024-07-31 | Stop reason: HOSPADM

## 2024-07-31 RX ORDER — LIDOCAINE HCL/PF 100 MG/5ML
SYRINGE (ML) INJECTION PRN
Status: DISCONTINUED | OUTPATIENT
Start: 2024-07-31 | End: 2024-07-31 | Stop reason: SDUPTHER

## 2024-07-31 RX ADMIN — Medication 70 MG: at 10:31

## 2024-07-31 RX ADMIN — SODIUM CHLORIDE, POTASSIUM CHLORIDE, SODIUM LACTATE AND CALCIUM CHLORIDE: 600; 310; 30; 20 INJECTION, SOLUTION INTRAVENOUS at 10:09

## 2024-07-31 RX ADMIN — PROPOFOL 100 MG: 10 INJECTION, EMULSION INTRAVENOUS at 10:31

## 2024-07-31 RX ADMIN — PROPOFOL 150 MCG/KG/MIN: 10 INJECTION, EMULSION INTRAVENOUS at 10:32

## 2024-07-31 ASSESSMENT — LIFESTYLE VARIABLES: SMOKING_STATUS: 0

## 2024-07-31 ASSESSMENT — PAIN SCALES - GENERAL: PAINLEVEL_OUTOF10: 0

## 2024-07-31 ASSESSMENT — PAIN - FUNCTIONAL ASSESSMENT: PAIN_FUNCTIONAL_ASSESSMENT: 0-10

## 2024-07-31 NOTE — DISCHARGE INSTRUCTIONS
ENDOSCOPY DISCHARGE INSTRUCTIONS:    Call the physician that did your procedure for any questions or concerns:           DR. LUJAN:  [phone]               ACTIVITY:    There are potential side effects from the medications used for sedation and anesthesia during your procedure.  These include:  Dizziness or light-headedness, confusion or memory loss, delayed reaction times, loss of coordination, nausea and vomiting.  Because of your increased risk for injury, we ask that you observe the following precautions:  For the next 24 hours,  DO NOT operate an automobile, bicycle, motorcycle, , power tools or large equipment of any kind.  Do not drink alcohol, sign any legal documents or make any legal decisions for 24 hours.  Do not bend your head over lower than your heart.  DO sit on the side of bed/couch awhile before getting up.  Plan on bedrest or quiet relaxation today.  You may resume normal activities in 24 hours.    DIET:    Your first meal today should be light, avoiding spicy and fatty foods.  If you tolerate this first meal, then you may advance to your regular diet unless otherwise advised by your physician.    NORMAL SYMPTOMS:  -Mild sore throat if you’ve had an EGD   -Gaseous discomfort if you've had an EGD or Colonoscopy.     NOTIFY YOUR PHYSICIAN IF THESE SYMPTOMS OCCUR:  1. Fever (greater than 100)  5. Increased abdominal bloating  2. Severe pain    6. Excessive bleeding  3. Nausea and vomiting  7. Chest pain                                                                    4. Chills    8. Shortness of breath      ADDITIONAL INSTRUCTIONS:    Biopsy results: To be discussed at your follow-up visit.    Educational Information:    Follow up: Schedule an appointment with Dr. Lujan for two weeks from now if you have not already done so.      Please review these discharge instructions this evening or tomorrow for  information you may have forgotten.            We want to thank you for choosing the

## 2024-07-31 NOTE — FLOWSHEET NOTE
Ambulatory Surgery/Procedure Discharge Note    Vitals:    07/31/24 1115   BP: 132/79   Pulse: 75   Resp: 18   Temp:    SpO2: 100%       In: 300 [I.V.:300]  Out: -     Restroom use offered before discharge.  Yes    Pain assessment:  level of pain (1-10, 10 severe),   Pain Level: 0      Pt and caregiver states \"ready to go home\". Pt alert and oriented x4. IV removed. Denies N/V or pain. Voided prior to discharge. Pt tolerating po intake. Discharge instructions given to pt and caregiver with pt permission. Pt and caregiver verbalized understanding of all instructions. Left with all belongings and written discharge instructions.   Patient discharged to home/self care. Patient discharged via wheel chair by transporter to waiting family.       7/31/2024 11:42 AM

## 2024-07-31 NOTE — H&P
History and Physical / Pre-Sedation Assessment    Patient:  Betsey Murphy   :   1970     Intended Procedure:  egd    HPI: abd pain    Past Medical History:   has a past medical history of Anxiety attack, Arthritis, Back pain, Depression, Diabetes mellitus type 1 (HCC), Diabetic neuropathy (HCC), Fibromyalgia, Migraine, and Numbness of toes.     Past Surgical History:   has a past surgical history that includes Ankle surgery (left ankle);  section; Dilation and curettage of uterus; Hysterectomy; tumor removal (bone); laparoscopy; Cholecystectomy; shoulder surgery (left); and Toe Surgery (x2 left foot 5th digit).     Medications:  Prior to Admission medications    Medication Sig Start Date End Date Taking? Authorizing Provider   insulin detemir (LEVEMIR FLEXTOUCH) 100 UNIT/ML injection pen Inject 16 Units into the skin nightly 21   Kelly Santacruz MD   amitriptyline (ELAVIL) 25 MG tablet Take 1 tablet by mouth nightly    Micha Burrows MD   calcium carbonate 600 MG TABS tablet Take 1 tablet by mouth daily    Micha Burrows MD   insulin lispro (HUMALOG) 100 UNIT/ML injection vial Inject into the skin 3 times daily (before meals)    Micha Burrows MD   potassium bicarbonate (K-LYTE) 25 MEQ disintegrating tablet Take 25 mEq by mouth 2 times daily    Micha Burrows MD   Lysine 500 MG TABS Take by mouth    Micha Burrows MD   omeprazole (PRILOSEC) 20 MG delayed release capsule Take 2 capsules by mouth daily    Micha Burrows MD   Omega-3 Fatty Acids (FISH OIL) 500 MG CAPS Take 500 mg by mouth 2 times daily    Micha Burrows MD   insulin aspart (NOVOLOG FLEXPEN) 100 UNIT/ML injection pen Inject 7 units before breakfast, 5 units before lunch and supper + sliding scale.  Patient not taking: Reported on 2021   Jacoby Meraz MD   atorvastatin (LIPITOR) 20 MG tablet Take 1 tablet by mouth daily 19   Jacoby Meraz MD   gabapentin

## 2024-07-31 NOTE — H&P
History and Physical / Pre-Sedation Assessment    Patient:  Betsey Murphy   :   1970     Intended Procedure:  egd    HPI: abdominal pain.. early satiety    Past Medical History:   has a past medical history of Anxiety attack, Arthritis, Back pain, Depression, Diabetes mellitus type 1 (HCC), Diabetic neuropathy (HCC), Fibromyalgia, Migraine, and Numbness of toes.     Past Surgical History:   has a past surgical history that includes Ankle surgery (left ankle);  section; Dilation and curettage of uterus; Hysterectomy; tumor removal (bone); laparoscopy; Cholecystectomy; shoulder surgery (left); and Toe Surgery (x2 left foot 5th digit).     Medications:  Prior to Admission medications    Medication Sig Start Date End Date Taking? Authorizing Provider   insulin detemir (LEVEMIR FLEXTOUCH) 100 UNIT/ML injection pen Inject 16 Units into the skin nightly 21   Kelly Santacruz MD   amitriptyline (ELAVIL) 25 MG tablet Take 25 mg by mouth nightly    Micha Burrows MD   calcium carbonate 600 MG TABS tablet Take 1 tablet by mouth daily    Micha Burrows MD   insulin lispro (HUMALOG) 100 UNIT/ML injection vial Inject into the skin 3 times daily (before meals)    Micha Burrows MD   potassium bicarbonate (K-LYTE) 25 MEQ disintegrating tablet Take 25 mEq by mouth 2 times daily    Micha Burrows MD   Lysine 500 MG TABS Take by mouth    Micha Burrows MD   omeprazole (PRILOSEC) 20 MG delayed release capsule Take 40 mg by mouth daily    Micha Burrows MD   Omega-3 Fatty Acids (FISH OIL) 500 MG CAPS Take 500 mg by mouth 2 times daily    Micha Brurows MD   insulin aspart (NOVOLOG FLEXPEN) 100 UNIT/ML injection pen Inject 7 units before breakfast, 5 units before lunch and supper + sliding scale.  Patient not taking: Reported on 2021   Jacoby Meraz MD   atorvastatin (LIPITOR) 20 MG tablet Take 1 tablet by mouth daily 19   Jacoby Meraz MD

## 2024-07-31 NOTE — ANESTHESIA PRE PROCEDURE
Department of Anesthesiology  Preprocedure Note       Name:  Betsey Murphy   Age:  54 y.o.  :  1970                                          MRN:  [id number]         Date:  2024      Surgeon: Surgeon(s):  Erasmo Gould MD    Procedure: Procedure(s):  ESOPHAGOGASTRODUODENOSCOPY    Medications prior to admission:   Prior to Admission medications    Medication Sig Start Date End Date Taking? Authorizing Provider   insulin detemir (LEVEMIR FLEXTOUCH) 100 UNIT/ML injection pen Inject 16 Units into the skin nightly 21   Kelly Santacruz MD   amitriptyline (ELAVIL) 25 MG tablet Take 25 mg by mouth nightly    Micha Burrows MD   calcium carbonate 600 MG TABS tablet Take 1 tablet by mouth daily    Micha Burrows MD   insulin lispro (HUMALOG) 100 UNIT/ML injection vial Inject into the skin 3 times daily (before meals)    Micha Burrows MD   potassium bicarbonate (K-LYTE) 25 MEQ disintegrating tablet Take 25 mEq by mouth 2 times daily    Micha Burrows MD   Lysine 500 MG TABS Take by mouth    Micha Burrows MD   omeprazole (PRILOSEC) 20 MG delayed release capsule Take 40 mg by mouth daily    Micha Burrows MD   Omega-3 Fatty Acids (FISH OIL) 500 MG CAPS Take 500 mg by mouth 2 times daily    Micha Burrows MD   insulin aspart (NOVOLOG FLEXPEN) 100 UNIT/ML injection pen Inject 7 units before breakfast, 5 units before lunch and supper + sliding scale.  Patient not taking: Reported on 2021   Jacoby Meraz MD   atorvastatin (LIPITOR) 20 MG tablet Take 1 tablet by mouth daily 19   Jacoby Meraz MD   gabapentin (NEURONTIN) 100 MG capsule Take 100 mg by mouth 3 times daily..    Micha Burrows MD   divalproex (DEPAKOTE SPRINKLES) 125 MG capsule Take 125 mg by mouth 3 times daily    Micha Burrows MD   potassium bicarb-citric acid (EFFER-K) 20 MEQ TBEF effervescent tablet Take 20 mEq by mouth daily    Micha Burrows MD

## 2024-07-31 NOTE — ANESTHESIA POSTPROCEDURE EVALUATION
Department of Anesthesiology  Postprocedure Note    Patient: Betsey Murphy  MRN: [id number]  YOB: 1970  Date of evaluation: 7/31/2024    Procedure Summary       Date: 07/31/24 Room / Location: [place] [address] / [facility]    Anesthesia Start: 1025 Anesthesia Stop: 1041    Procedure: ESOPHAGOGASTRODUODENOSCOPY BIOPSY Diagnosis:       Abdominal pain, unspecified abdominal location      (Abdominal pain, unspecified abdominal location [R10.9])    Surgeons: Erasmo Gould MD Responsible Provider: Janice Resendiz DO    Anesthesia Type: MAC ASA Status: 3            Anesthesia Type: No value filed.    Zoe Phase I: Zoe Score: 10    Zoe Phase II: Zoe Score: 10    Anesthesia Post Evaluation    Patient location during evaluation: PACU  Patient participation: complete - patient participated  Level of consciousness: awake and alert  Airway patency: patent  Nausea & Vomiting: no nausea and no vomiting  Cardiovascular status: blood pressure returned to baseline  Respiratory status: acceptable  Hydration status: euvolemic  Pain management: adequate    No notable events documented.

## 2024-07-31 NOTE — PROCEDURES
[address] [address]                             PROCEDURE NOTE      PATIENT NAME: SABRA SMITH             : 1970  MED REC NO: [id number]                      ROOM: Endo Pool  ACCOUNT NO: [id number]                       ADMIT DATE: 2024  PROVIDER: Erasmo Gould MD      DATE OF PROCEDURE:  2024    SURGEON:  Erasmo Gould MD    INDICATION FOR PROCEDURE:  A 54-year-old woman who presented with abdominal pain and early satiety. Past history significant for gastric ulcer.  Symptoms continued despite treatment with PPI.    DESCRIPTION OF PROCEDURE:  With the patient in the left lateral position and after IV Diprivan, the Olympus video endoscope was introduced into the esophagus and advanced toward the stomach.  The esophagus was normal.  Stomach was carefully inspected.  It was normal.  Biopsy was obtained for Helicobacter pylori screening from the antrum.  The duodenum was normal.  Scope was then removed without complication.    IMPRESSION:  Normal esophagogastroduodenoscopy.    ESTIMATED BLOOD LOSS:  None.          ERASMO GOULD MD      D:  2024 10:43:36     T:  2024 11:04:42     DELIO/BROOKE  Job #:  [id number]     Doc#:  [id number]

## 2025-01-24 ENCOUNTER — HOSPITAL ENCOUNTER (OUTPATIENT)
Age: 55
Setting detail: OUTPATIENT SURGERY
Discharge: HOME | End: 2025-01-24
Attending: INTERNAL MEDICINE | Admitting: INTERNAL MEDICINE
Payer: MEDICARE

## 2025-01-24 ENCOUNTER — APPOINTMENT (OUTPATIENT)
Dept: ENDOSCOPY | Age: 55
End: 2025-01-24
Attending: INTERNAL MEDICINE
Payer: MEDICARE

## 2025-01-24 ENCOUNTER — ANESTHESIA (OUTPATIENT)
Dept: ENDOSCOPY | Age: 55
End: 2025-01-24
Payer: MEDICARE

## 2025-01-24 ENCOUNTER — ANESTHESIA EVENT (OUTPATIENT)
Dept: ENDOSCOPY | Age: 55
End: 2025-01-24
Payer: MEDICARE

## 2025-01-24 VITALS
BODY MASS INDEX: 29.06 KG/M2 | TEMPERATURE: 97.1 F | HEART RATE: 89 BPM | OXYGEN SATURATION: 99 % | SYSTOLIC BLOOD PRESSURE: 146 MMHG | DIASTOLIC BLOOD PRESSURE: 74 MMHG | WEIGHT: 148 LBS | HEIGHT: 60 IN | RESPIRATION RATE: 18 BRPM

## 2025-01-24 DIAGNOSIS — D50.9 IRON DEFICIENCY ANEMIA, UNSPECIFIED IRON DEFICIENCY ANEMIA TYPE: ICD-10-CM

## 2025-01-24 LAB
GLUCOSE BLD-MCNC: 263 MG/DL (ref 70–99)
PERFORMED ON: ABNORMAL

## 2025-01-24 PROCEDURE — 6360000002 HC RX W HCPCS

## 2025-01-24 PROCEDURE — 2580000003 HC RX 258: Performed by: ANESTHESIOLOGY

## 2025-01-24 PROCEDURE — 3700000001 HC ADD 15 MINUTES (ANESTHESIA): Performed by: INTERNAL MEDICINE

## 2025-01-24 PROCEDURE — 3700000000 HC ANESTHESIA ATTENDED CARE: Performed by: INTERNAL MEDICINE

## 2025-01-24 PROCEDURE — 7100000011 HC PHASE II RECOVERY - ADDTL 15 MIN: Performed by: INTERNAL MEDICINE

## 2025-01-24 PROCEDURE — 7100000010 HC PHASE II RECOVERY - FIRST 15 MIN: Performed by: INTERNAL MEDICINE

## 2025-01-24 PROCEDURE — 2709999900 HC NON-CHARGEABLE SUPPLY: Performed by: INTERNAL MEDICINE

## 2025-01-24 PROCEDURE — 88305 TISSUE EXAM BY PATHOLOGIST: CPT

## 2025-01-24 PROCEDURE — 3609010600 HC COLONOSCOPY POLYPECTOMY SNARE/COLD BIOPSY: Performed by: INTERNAL MEDICINE

## 2025-01-24 RX ORDER — ESMOLOL HYDROCHLORIDE 10 MG/ML
INJECTION INTRAVENOUS
Status: DISCONTINUED | OUTPATIENT
Start: 2025-01-24 | End: 2025-01-24 | Stop reason: SDUPTHER

## 2025-01-24 RX ORDER — PROPOFOL 10 MG/ML
INJECTION, EMULSION INTRAVENOUS
Status: DISCONTINUED | OUTPATIENT
Start: 2025-01-24 | End: 2025-01-24 | Stop reason: SDUPTHER

## 2025-01-24 RX ORDER — SODIUM CHLORIDE, SODIUM LACTATE, POTASSIUM CHLORIDE, CALCIUM CHLORIDE 600; 310; 30; 20 MG/100ML; MG/100ML; MG/100ML; MG/100ML
INJECTION, SOLUTION INTRAVENOUS CONTINUOUS
Status: DISCONTINUED | OUTPATIENT
Start: 2025-01-24 | End: 2025-01-24 | Stop reason: HOSPADM

## 2025-01-24 RX ORDER — LIDOCAINE HYDROCHLORIDE 20 MG/ML
INJECTION, SOLUTION INFILTRATION; PERINEURAL
Status: DISCONTINUED | OUTPATIENT
Start: 2025-01-24 | End: 2025-01-24 | Stop reason: SDUPTHER

## 2025-01-24 RX ORDER — GLYCOPYRROLATE 0.2 MG/ML
INJECTION INTRAMUSCULAR; INTRAVENOUS
Status: DISCONTINUED | OUTPATIENT
Start: 2025-01-24 | End: 2025-01-24 | Stop reason: SDUPTHER

## 2025-01-24 RX ADMIN — ESMOLOL HYDROCHLORIDE 20 MG: 10 INJECTION, SOLUTION INTRAVENOUS at 10:28

## 2025-01-24 RX ADMIN — SODIUM CHLORIDE, POTASSIUM CHLORIDE, SODIUM LACTATE AND CALCIUM CHLORIDE: 600; 310; 30; 20 INJECTION, SOLUTION INTRAVENOUS at 09:36

## 2025-01-24 RX ADMIN — LIDOCAINE HYDROCHLORIDE 60 MG: 20 INJECTION, SOLUTION INFILTRATION; PERINEURAL at 10:04

## 2025-01-24 RX ADMIN — PROPOFOL 150 MCG/KG/MIN: 10 INJECTION, EMULSION INTRAVENOUS at 10:04

## 2025-01-24 RX ADMIN — PROPOFOL 50 MG: 10 INJECTION, EMULSION INTRAVENOUS at 10:03

## 2025-01-24 RX ADMIN — GLYCOPYRROLATE 0.2 MG: 0.2 INJECTION INTRAMUSCULAR; INTRAVENOUS at 10:13

## 2025-01-24 ASSESSMENT — PAIN - FUNCTIONAL ASSESSMENT
PAIN_FUNCTIONAL_ASSESSMENT: PREVENTS OR INTERFERES SOME ACTIVE ACTIVITIES AND ADLS
PAIN_FUNCTIONAL_ASSESSMENT: 0-10
PAIN_FUNCTIONAL_ASSESSMENT: ACTIVITIES ARE NOT PREVENTED
PAIN_FUNCTIONAL_ASSESSMENT: 0-10

## 2025-01-24 ASSESSMENT — PAIN DESCRIPTION - LOCATION: LOCATION: ABDOMEN

## 2025-01-24 ASSESSMENT — PAIN SCALES - GENERAL: PAINLEVEL_OUTOF10: 6

## 2025-01-24 ASSESSMENT — PAIN DESCRIPTION - DESCRIPTORS
DESCRIPTORS: DISCOMFORT;PRESSURE
DESCRIPTORS: DISCOMFORT
DESCRIPTORS: ACHING

## 2025-01-24 ASSESSMENT — PAIN DESCRIPTION - PAIN TYPE: TYPE: ACUTE PAIN

## 2025-01-24 NOTE — PROGRESS NOTES
Ambulatory Surgery/Procedure Discharge Note    Vitals:    01/24/25 1110   BP: (!) 146/74   Pulse: 89   Resp: 18   Temp: 97.1 °F (36.2 °C)   SpO2: 99%       In: 700 [I.V.:700]  Out: -     Restroom use offered before discharge.  Yes  Pt is alert oriented and discharge instructions were read at bedside. Pt c/o abd pain 6/10.  Pt refused to roll on her side. Awaiting for Dr. Gould at bedside.pt slightly passing gas with Dr. Gould at bedside. Pt is toileted and continuous to pas gas. Pt on clear liquid diet today.   Pain is a lot better after toileting.    Pain assessment:  present - adequately treated  Pain Level: 6        Patient discharged to home/self care. Patient discharged via wheel chair by transporter to waiting family/S.O.       1/24/2025 11:23 AM

## 2025-01-24 NOTE — DISCHARGE INSTRUCTIONS
ENDOSCOPY DISCHARGE INSTRUCTIONS:    Call the physician that did your procedure for any questions or concerns:           DR. LUJAN:  [phone]               ACTIVITY:    There are potential side effects from the medications used for sedation and anesthesia during your procedure.  These include:  Dizziness or light-headedness, confusion or memory loss, delayed reaction times, loss of coordination, nausea and vomiting.  Because of your increased risk for injury, we ask that you observe the following precautions:  For the next 24 hours,  DO NOT operate an automobile, bicycle, motorcycle, , power tools or large equipment of any kind.  Do not drink alcohol, sign any legal documents or make any legal decisions for 24 hours.  Do not bend your head over lower than your heart.  DO sit on the side of bed/couch awhile before getting up.  Plan on bedrest or quiet relaxation today.  You may resume normal activities in 24 hours.    DIET:    Your first meal today should be light, avoiding spicy and fatty foods.  If you tolerate this first meal, then you may advance to your regular diet unless otherwise advised by your physician.    NORMAL SYMPTOMS:  -Mild sore throat if you’ve had an EGD   -Gaseous discomfort if you've had an EGD or Colonoscopy.     NOTIFY YOUR PHYSICIAN IF THESE SYMPTOMS OCCUR:  1. Fever (greater than 100)  5. Increased abdominal bloating  2. Severe pain    6. Excessive bleeding  3. Nausea and vomiting  7. Chest pain                                                                    4. Chills    8. Shortness of breath      ADDITIONAL INSTRUCTIONS:    Biopsy results: To be discussed at your follow-up visit.    Follow up: Schedule an appointment with Dr. Lujan for two weeks from now if you have not already done so.      Please review these discharge instructions this evening or tomorrow for  information you may have forgotten.            We want to thank you for choosing the [facility] as your

## 2025-01-24 NOTE — OP NOTE
[address] [address]                            OPERATIVE REPORT      PATIENT NAME: SABRA SMITH             : 1970  MED REC NO: [id number]                      ROOM: Endo Pool  ACCOUNT NO: [id number]                       ADMIT DATE: 2025  PROVIDER: Erasmo Gould MD      DATE OF PROCEDURE:  2025    SURGEON:  Erasmo Gould MD    INDICATION FOR PROCEDURE:  A 54-year-old woman with a history of polyps over 10 years ago.  Recent weight loss and mild anemia with decreased appetite.    DESCRIPTION OF PROCEDURE:  With the patient in the left lateral position and after IV Diprivan, the Olympus video colonoscope was inserted into the rectum and carefully advanced to the cecum.  A 1 cm polyp was noted in the ascending colon.  We removed it with a polypectomy snare technique.  It appeared that there was a small polyp in the transverse colon that sloughed off by itself as we removed the scope.  Careful inspection did not show any sign of carcinoma, inflammatory bowel, diverticulosis, or angiodysplasia.  Scope was then removed without complication.    IMPRESSION:    1. Ascending colon polyp.  2. Small transverse colon polyp.    ESTIMATED BLOOD LOSS:  None.    Surveillance colonoscopy is recommended in 5 years.          ERASMO GOULD MD      D:  2025 10:53:27     T:  2025 12:20:39     CRYSTAL  Job #:  [id number]     Doc#:  [id number]

## 2025-01-24 NOTE — H&P
History and Physical / Pre-Sedation Assessment    Patient:  Betsey Murphy   :   1970     Intended Procedure:  colonoscopy    HPI: iron def anemia    Past Medical History:   has a past medical history of Anxiety attack, Arthritis, Back pain, Depression, Diabetes mellitus type 1 (HCC), Diabetic neuropathy (HCC), Fibromyalgia, Migraine, and Numbness of toes.     Past Surgical History:   has a past surgical history that includes Ankle surgery (left ankle);  section; Dilation and curettage of uterus; Hysterectomy; tumor removal (bone); laparoscopy; Cholecystectomy; shoulder surgery (left); Toe Surgery (x2 left foot 5th digit); and Upper gastrointestinal endoscopy (N/A, 2024).     Medications:  Prior to Admission medications    Medication Sig Start Date End Date Taking? Authorizing Provider   insulin detemir (LEVEMIR FLEXTOUCH) 100 UNIT/ML injection pen Inject 16 Units into the skin nightly 21   Kelly Santacruz MD   amitriptyline (ELAVIL) 25 MG tablet Take 1 tablet by mouth nightly    Micha Burrows MD   calcium carbonate 600 MG TABS tablet Take 1 tablet by mouth daily    Micha Burrows MD   insulin lispro (HUMALOG) 100 UNIT/ML injection vial Inject into the skin 3 times daily (before meals)    Micha Burrows MD   potassium bicarbonate (K-LYTE) 25 MEQ disintegrating tablet Take 25 mEq by mouth 2 times daily    Micha Burrows MD   Lysine 500 MG TABS Take by mouth    Mciha Burrows MD   omeprazole (PRILOSEC) 20 MG delayed release capsule Take 2 capsules by mouth daily    Micha Burrows MD   Omega-3 Fatty Acids (FISH OIL) 500 MG CAPS Take 500 mg by mouth 2 times daily    Micha Burrows MD   insulin aspart (NOVOLOG FLEXPEN) 100 UNIT/ML injection pen Inject 7 units before breakfast, 5 units before lunch and supper + sliding scale.  Patient not taking: Reported on 2021   Jacoby Meraz MD   atorvastatin (LIPITOR) 20 MG tablet Take 1

## 2025-01-24 NOTE — PROGRESS NOTES
Report to [facility]. Spoke with pt's nurse, Sera and pt will be returning to the nursing facility after Dr. Gould speaks with patient. Pt does complain of Right mid and lower quadrant pain as \"pressure\" and is encouraged to move and reposition herself in the bed to try to release the gas. Pt switches from left side laying to laying on her back and states that movement feels  a little better.

## 2025-01-24 NOTE — ANESTHESIA POSTPROCEDURE EVALUATION
Department of Anesthesiology  Postprocedure Note    Patient: Betsey Murphy  MRN: [id number]  YOB: 1970  Date of evaluation: 1/24/2025    Procedure Summary       Date: 01/24/25 Room / Location: [place] [address] / [facility]    Anesthesia Start: 0956 Anesthesia Stop: 1046    Procedure: COLONOSCOPY POLYPECTOMY SNARE/BIOPSY Diagnosis:       Iron deficiency anemia, unspecified iron deficiency anemia type      (Iron deficiency anemia, unspecified iron deficiency anemia type [D50.9])    Surgeons: Erasmo Gould MD Responsible Provider: Ward Abreu MD    Anesthesia Type: MAC ASA Status: 3            Anesthesia Type: No value filed.    Zoe Phase I: Zoe Score: 10    Zoe Phase II: Zoe Score: 10    Anesthesia Post Evaluation    Patient location during evaluation: PACU  Patient participation: complete - patient participated  Level of consciousness: awake  Pain score: 0  Airway patency: patent  Nausea & Vomiting: no nausea  Cardiovascular status: hemodynamically stable  Respiratory status: acceptable  Hydration status: stable  Pain management: adequate    No notable events documented.

## 2025-01-24 NOTE — ANESTHESIA PRE PROCEDURE
Cigarettes     Start date:      Quit date: 2017     Years since quittin.0   • Smokeless tobacco: Never   • Tobacco comments:     still not ready to quit 14   Substance Use Topics   • Alcohol use: No                                Counseling given: Not Answered  Tobacco comments: still not ready to quit 14      Vital Signs (Current):   Vitals:    25 0902   BP: (!) 159/89   Pulse: (!) 109   Resp: 16   Temp: 97.2 °F (36.2 °C)   TempSrc: Temporal   SpO2: 98%   Weight: 67.1 kg (148 lb)   Height: 1.524 m (5')                                              BP Readings from Last 3 Encounters:   25 (!) 159/89   24 132/79   21 134/74       NPO Status: Time of last liquid consumption:                         Time of last solid consumption:                         Date of last liquid consumption: 25                        Date of last solid food consumption: 25    BMI:   Wt Readings from Last 3 Encounters:   25 67.1 kg (148 lb)   24 59 kg (130 lb)   21 76.2 kg (168 lb)     Body mass index is 28.9 kg/m².    CBC:   Lab Results   Component Value Date/Time    WBC 9.5 2013 08:16 PM    RBC 3.96 2013 08:16 PM    HGB 12.5 2013 08:16 PM    HCT 36.6 2013 08:16 PM    MCV 92.6 2013 08:16 PM    RDW 12.8 2013 08:16 PM     2013 08:16 PM       CMP:   Lab Results   Component Value Date/Time     2017 09:09 AM    K 4.1 2017 09:09 AM    CL 87 2017 09:09 AM    CO2 24 2017 09:09 AM    BUN 15 2017 09:09 AM    CREATININE 0.87 2017 09:09 AM    GFRAA 91 2017 09:09 AM    GFRAA 129 2013 03:50 PM    AGRATIO 1.9 2017 09:09 AM    LABGLOM 79 2017 09:09 AM    LABGLOM >60 05/15/2012 10:46 AM    GLUCOSE 163 2017 09:09 AM    CALCIUM 9.7 2017 09:09 AM    BILITOT 0.3 2017 09:09 AM    ALKPHOS 87 2017 09:09 AM    AST 37 2017 09:09 AM    ALT 77

## (undated) DEVICE — CANNULA SAMP CO2 AD GRN 7FT CO2 AND 7FT O2 TBNG UNIV CONN

## (undated) DEVICE — TRAP SPEC RETRV CLR PLAS POLYP IN LN SUCT QUIK CTCH

## (undated) DEVICE — FORCEPS BX L240CM JAW DIA2.4MM ORNG L CAP W/ NDL DISP RAD

## (undated) DEVICE — SNARES COLD OVAL 10MM THIN

## (undated) DEVICE — FORCEPS BX L240CM WRK CHN 2.8MM STD CAP W/ NDL MIC MESH